# Patient Record
Sex: MALE
[De-identification: names, ages, dates, MRNs, and addresses within clinical notes are randomized per-mention and may not be internally consistent; named-entity substitution may affect disease eponyms.]

---

## 2018-01-22 ENCOUNTER — HOSPITAL ENCOUNTER (EMERGENCY)
Dept: HOSPITAL 5 - ED | Age: 23
LOS: 1 days | Discharge: HOME | End: 2018-01-23
Payer: SELF-PAY

## 2018-01-22 VITALS — DIASTOLIC BLOOD PRESSURE: 54 MMHG | SYSTOLIC BLOOD PRESSURE: 113 MMHG

## 2018-01-22 DIAGNOSIS — Z90.49: ICD-10-CM

## 2018-01-22 DIAGNOSIS — F12.10: ICD-10-CM

## 2018-01-22 DIAGNOSIS — R07.89: Primary | ICD-10-CM

## 2018-01-22 DIAGNOSIS — F17.200: ICD-10-CM

## 2018-01-22 LAB
BASOPHILS # (AUTO): 0 K/MM3 (ref 0–0.1)
BASOPHILS NFR BLD AUTO: 0.1 % (ref 0–1.8)
EOSINOPHIL # BLD AUTO: 0.1 K/MM3 (ref 0–0.4)
EOSINOPHIL NFR BLD AUTO: 1.1 % (ref 0–4.3)
HCT VFR BLD CALC: 43.2 % (ref 35.5–45.6)
HGB BLD-MCNC: 14.4 GM/DL (ref 11.8–15.2)
LYMPHOCYTES # BLD AUTO: 1.7 K/MM3 (ref 1.2–5.4)
LYMPHOCYTES NFR BLD AUTO: 32.3 % (ref 13.4–35)
MCH RBC QN AUTO: 29 PG (ref 28–32)
MCHC RBC AUTO-ENTMCNC: 34 % (ref 32–34)
MCV RBC AUTO: 86 FL (ref 84–94)
MONOCYTES # (AUTO): 0.4 K/MM3 (ref 0–0.8)
MONOCYTES % (AUTO): 7.8 % (ref 0–7.3)
PLATELET # BLD: 173 K/MM3 (ref 140–440)
RBC # BLD AUTO: 5 M/MM3 (ref 3.65–5.03)

## 2018-01-22 PROCEDURE — 93005 ELECTROCARDIOGRAM TRACING: CPT

## 2018-01-22 PROCEDURE — 84484 ASSAY OF TROPONIN QUANT: CPT

## 2018-01-22 PROCEDURE — 99284 EMERGENCY DEPT VISIT MOD MDM: CPT

## 2018-01-22 PROCEDURE — 93010 ELECTROCARDIOGRAM REPORT: CPT

## 2018-01-22 PROCEDURE — 80048 BASIC METABOLIC PNL TOTAL CA: CPT

## 2018-01-22 PROCEDURE — 85025 COMPLETE CBC W/AUTO DIFF WBC: CPT

## 2018-01-22 PROCEDURE — 36415 COLL VENOUS BLD VENIPUNCTURE: CPT

## 2018-01-23 LAB
BUN SERPL-MCNC: 11 MG/DL (ref 9–20)
BUN/CREAT SERPL: 14 %
CALCIUM SERPL-MCNC: 8.8 MG/DL (ref 8.4–10.2)
HEMOLYSIS INDEX: 10

## 2018-01-23 NOTE — EMERGENCY DEPARTMENT REPORT
ED Chest Pain HPI





- General


Chief Complaint: Chest Pain


Stated Complaint: CHEST PAIN


Time Seen by Provider: 18 01:43


Source: patient


Mode of arrival: Ambulatory


Limitations: No Limitations





- History of Present Illness


Initial Comments: 


Patient is a 22-year-old -American male  history appendectomy,  2 pack 

year smoking history, occasional marijuana, occasional EtOH patient presents 

for left-sided chest pain intermittent for the past year patient denies 

shortness of breath no nausea vomiting does endorse anxiety stating I woke up 

from a dream with the chest pain pain described as 2/10 pressure , with 

intermittent sob, pt denies pain or sob at this time,  there is no dizziness no 

lightheadedness no n/v 


MD Complaint: chest pain


Onset/Timin


-: year(s)


Onset: awoke with symptoms


Pain Location: left chest


Pain Radiation: none


Severity: moderate


Severity scale (0 -10): 3


Quality: pressure


Consistency: intermittent


Improves With: rest


Worsens With: other (anxiety )


re: denies: nausea, vomting, diaphoresis, dyspnea, sense of impending doom


Other Symptoms: denies: cough, fever, syncope, rash, acid taste in mouth, leg 

swelling, palpitations, burping


Treatments Prior to Arrival: none


Aspirin use within the Past 7 Days: (0) No





- Related Data


 Previous Rx's











 Medication  Instructions  Recorded  Last Taken  Type


 


Naproxen 500 mg PO BID PRN #30 tablet 18 Unknown Rx











 Allergies











Allergy/AdvReac Type Severity Reaction Status Date / Time


 


No Known Allergies Allergy   Verified 18 01:43














Heart Score





- HEART Score


History: Slightly suspicious


EKG: Normal


Age: < 45


Risk factors: No known risk factors


Troponin: < normal limit


HEART Score: 0





ED Review of Systems


ROS: 


Stated complaint: CHEST PAIN


Other details as noted in HPI





Constitutional: denies: chills, fever


Eyes: denies: eye pain, eye discharge, vision change


ENT: denies: ear pain, throat pain


Respiratory: denies: cough, shortness of breath, wheezing


Cardiovascular: chest pain.  denies: palpitations


Endocrine: no symptoms reported


Gastrointestinal: denies: abdominal pain, nausea, diarrhea


Genitourinary: denies: urgency, dysuria


Musculoskeletal: denies: back pain, joint swelling, arthralgia


Skin: denies: rash, lesions


Neurological: denies: headache, weakness, paresthesias


Psychiatric: denies: anxiety, depression


Hematological/Lymphatic: denies: easy bleeding, easy bruising





ED Past Medical Hx





- Past Medical History


Previous Medical History?: No





- Surgical History


Hx Appendectomy: Yes





- Social History


Smoking Status: Current Every Day Smoker


Substance Use Type: Alcohol, Marijuana





- Medications


Home Medications: 


 Home Medications











 Medication  Instructions  Recorded  Confirmed  Last Taken  Type


 


Naproxen 500 mg PO BID PRN #30 tablet 18  Unknown Rx














ED Physical Exam





- General


Limitations: No Limitations


General appearance: alert, in no apparent distress





- Head


Head exam: Present: atraumatic, normocephalic





- Eye


Eye exam: Present: normal appearance





- ENT


ENT exam: Present: mucous membranes moist





- Neck


Neck exam: Present: normal inspection, full ROM.  Absent: tenderness, 

meningismus, lymphadenopathy, thyromegaly





- Respiratory


Respiratory exam: Present: normal lung sounds bilaterally, chest wall 

tenderness.  Absent: respiratory distress, wheezes, rhonchi





- Cardiovascular


Cardiovascular Exam: Present: regular rate, normal rhythm, normal heart sounds.

  Absent: systolic murmur, diastolic murmur, rubs, gallop





- GI/Abdominal


GI/Abdominal exam: Present: soft, normal bowel sounds.  Absent: distended, 

tenderness, guarding, rebound, rigid, organomegaly, mass, bruit, pulsatile mass

, hernia





- Rectal


Rectal exam: Present: deferred





- Extremities Exam


Extremities exam: Present: normal inspection





- Back Exam


Back exam: Present: normal inspection





- Neurological Exam


Neurological exam: Present: alert, oriented X3





- Psychiatric


Psychiatric exam: Present: anxious





- Skin


Skin exam: Present: warm, dry, intact, normal color.  Absent: rash





ED Course


 Vital Signs











  18





  22:26


 


Temperature 98.1 F


 


Pulse Rate 63


 


Respiratory 18





Rate 


 


Blood Pressure 113/54


 


O2 Sat by Pulse 100





Oximetry 














CHRISTEN score





- Christen Score


Age > 65: (0) No


Aspirin use within the Past 7 Days: (0) No


3 or more CAD Risk Factors: (0) No


2 or more Angina events in past 24 hrs: (0) No


Known CAD with more than 50% Stenosis: (0) No


Elevated Cardiac Markers: (0) No


ST Deviation Greater than 0.5mm: (0) No


CHRISTEN Score: 0





ED Medical Decision Making





- Lab Data


Result diagrams: 


 18 22:44








- EKG Data


EKG shows normal: sinus rhythm


Rate: normal





- EKG Data


Interpretation: normal EKG





- Medical Decision Making


Patient is a 22-year-old -American male  history appendectomy,  2 pack 

year smoking history, occasional marijuana, occasional EtOH patient presents 

for left-sided chest pain intermittent for the past year patient denies 

shortness of breath no nausea vomiting does endorse anxiety stating I woke up 

from a dream with the chest pain pain described as 2/10 pressure , with 

intermittent sob, pt denies pain or sob at this time,  there is no dizziness no 

lightheadedness no n/v, pt appears well nontoxic , mildly anxious lungs clear 

bilat no wheezing, left lateral chest wall pain to palpation ekg : nsr interp 

by ED attending, trop: pending   cbc: normal ,  heart score: 0  pt advises that 

he cannot wait for trop level given low risk of mi this is reasonable, plan:  

DX with Chest wall pain ,rx naproxen prn chest wall pain follow up with primary 

care in 3- days, pt verbalized agreement and understanding of discharge plan, 

will note trop result when available. pt is a/o x 3 at this , ambulatory gait 

steady with nad at this time. 





Critical care attestation.: 


If time is entered above; I have spent that time in minutes in the direct care 

of this critically ill patient, excluding procedure time.








ED Disposition


Clinical Impression: 


 Chest wall pain





Disposition: DC-01 TO HOME OR SELFCARE


Is pt being admited?: No


Does the pt Need Aspirin: No


Condition: Good


Instructions:  Chest Pain (ED)


Prescriptions: 


Naproxen 500 mg PO BID PRN #30 tablet


 PRN Reason: Pain


Referrals: 


BRADLEY MOORE MD [Staff Physician] - 3-5 Days


Forms:  Work/School Release Form(ED)